# Patient Record
Sex: FEMALE | Race: BLACK OR AFRICAN AMERICAN | NOT HISPANIC OR LATINO | Employment: OTHER | URBAN - METROPOLITAN AREA
[De-identification: names, ages, dates, MRNs, and addresses within clinical notes are randomized per-mention and may not be internally consistent; named-entity substitution may affect disease eponyms.]

---

## 2017-07-24 ENCOUNTER — APPOINTMENT (OUTPATIENT)
Dept: WOMENS IMAGING | Facility: HOSPITAL | Age: 67
End: 2017-07-24

## 2017-07-24 PROCEDURE — 77063 BREAST TOMOSYNTHESIS BI: CPT | Performed by: RADIOLOGY

## 2017-07-24 PROCEDURE — G0202 SCR MAMMO BI INCL CAD: HCPCS | Performed by: RADIOLOGY

## 2017-07-24 PROCEDURE — MDREVIEWSP: Performed by: RADIOLOGY

## 2018-09-13 ENCOUNTER — APPOINTMENT (OUTPATIENT)
Dept: WOMENS IMAGING | Facility: HOSPITAL | Age: 68
End: 2018-09-13

## 2018-09-13 PROCEDURE — 77063 BREAST TOMOSYNTHESIS BI: CPT | Performed by: RADIOLOGY

## 2018-09-13 PROCEDURE — 77067 SCR MAMMO BI INCL CAD: CPT | Performed by: RADIOLOGY

## 2018-09-13 PROCEDURE — MDREVIEWSP: Performed by: RADIOLOGY

## 2019-09-17 ENCOUNTER — APPOINTMENT (OUTPATIENT)
Dept: WOMENS IMAGING | Facility: HOSPITAL | Age: 69
End: 2019-09-17

## 2019-09-17 PROCEDURE — 77063 BREAST TOMOSYNTHESIS BI: CPT | Performed by: RADIOLOGY

## 2019-09-17 PROCEDURE — 77067 SCR MAMMO BI INCL CAD: CPT | Performed by: RADIOLOGY

## 2019-09-17 PROCEDURE — MDREVIEWSP: Performed by: RADIOLOGY

## 2020-07-21 ENCOUNTER — OFFICE VISIT (OUTPATIENT)
Dept: CARDIOLOGY | Facility: CLINIC | Age: 70
End: 2020-07-21

## 2020-07-21 VITALS
WEIGHT: 182 LBS | HEART RATE: 57 BPM | SYSTOLIC BLOOD PRESSURE: 140 MMHG | DIASTOLIC BLOOD PRESSURE: 72 MMHG | BODY MASS INDEX: 31.07 KG/M2 | HEIGHT: 64 IN

## 2020-07-21 DIAGNOSIS — I10 ESSENTIAL HYPERTENSION: ICD-10-CM

## 2020-07-21 DIAGNOSIS — E78.5 DYSLIPIDEMIA: ICD-10-CM

## 2020-07-21 DIAGNOSIS — Z82.49 FAMILY HISTORY OF PREMATURE CORONARY ARTERY DISEASE: ICD-10-CM

## 2020-07-21 DIAGNOSIS — I20.0 UNSTABLE ANGINA (HCC): Primary | ICD-10-CM

## 2020-07-21 DIAGNOSIS — I25.10 CAD IN NATIVE ARTERY: ICD-10-CM

## 2020-07-21 PROBLEM — M10.9 GOUT: Status: ACTIVE | Noted: 2020-07-21

## 2020-07-21 PROBLEM — N18.9 CKD (CHRONIC KIDNEY DISEASE): Status: ACTIVE | Noted: 2020-07-21

## 2020-07-21 PROBLEM — E11.9 DIABETES: Status: ACTIVE | Noted: 2020-07-21

## 2020-07-21 PROCEDURE — 93000 ELECTROCARDIOGRAM COMPLETE: CPT | Performed by: INTERNAL MEDICINE

## 2020-07-21 PROCEDURE — 99204 OFFICE O/P NEW MOD 45 MIN: CPT | Performed by: INTERNAL MEDICINE

## 2020-07-21 NOTE — PROGRESS NOTES
Subjective:     Encounter Date:07/21/2020      Patient ID: Suzi Lindquist is a 69 y.o. female.    Chief Complaint:  Chief Complaint   Patient presents with   • Coronary Artery Disease       HPI:  History of Present Illness     I had the pleasure of seeing Ms. Lindquist in the office today.  She is a very pleasant 69-year-old female with a history of essential hypertension, dyslipidemia, diabetes mellitus, CKD (followed by Dr. Brendon Paiz) and positive family history of premature coronary disease.  She had cardiac catheterization in 2013 which revealed nonobstructive disease in her LAD.    Ms. Lindquist states that she has been experiencing chest discomfort with exertion for couple of years.  She states that when she is walking she begins to feel chest tightness and heaviness.  She states that she will stop her exertion and the symptoms will subside in just a few minutes.  There is no associated shortness of air, diaphoresis, nausea or vomiting.  She states that recently she was walking after eating a meal and had to stop 6-7 times due to chest discomfort.  She does not have palpitations, dizziness or near syncope.  She denies lower extremity edema, orthopnea or paroxysmal nocturnal dyspnea.  She has been told that she snores on occasion.  She has never been diagnosed with sleep apnea.  She does not use tobacco or alcohol.  Her EKG in the office today demonstrates sinus bradycardia, rate 57 otherwise normal    The following portions of the patient's history were reviewed and updated as appropriate: allergies, current medications, past family history, past medical history, past social history, past surgical history and problem list.    Problem List:  Patient Active Problem List   Diagnosis   • CAD in native artery   • Essential hypertension   • Diabetes (CMS/HCC)   • Family history of premature coronary artery disease   • Dyslipidemia   • Gout   • CKD (chronic kidney disease)   • Unstable angina (CMS/HCC)       Past  "Medical History:  Past Medical History:   Diagnosis Date   • Chronic kidney disease    • Coronary artery disease    • Diabetes mellitus (CMS/HCC)    • Gout    • Hyperlipidemia    • Hypertension        Past Surgical History:  Past Surgical History:   Procedure Laterality Date   • CARDIAC CATHETERIZATION  07/15/2013    LAD extended to the apex & there was 20% stenosis of the proximal segment followed by 30% to 50% stenosis in the mid segment. The left circumflex & RCAs were angiographically normal.     • [facility]&C HYSTEROSCOPY ENDOMETRIAL ABLATION DIAGNOSTIC LAPAROSCOPY     • GUM SURGERY         Social History:  Social History     Socioeconomic History   • Marital status:      Spouse name: Not on file   • Number of children: Not on file   • Years of education: Not on file   • Highest education level: Not on file   Tobacco Use   • Smoking status: Never Smoker   • Smokeless tobacco: Never Used       Allergies:  Allergies   Allergen Reactions   • Pineapple Itching     \"throat itch\"   • Avocado Nausea And Vomiting       Immunizations:    There is no immunization history on file for this patient.    ROS:  Review of Systems   Constitution: Negative for chills, decreased appetite, fever, malaise/fatigue, weight gain and weight loss.   HENT: Negative for congestion, hoarse voice, nosebleeds and sore throat.    Eyes: Negative for blurred vision, double vision and visual disturbance.   Cardiovascular: Positive for chest pain. Negative for claudication, dyspnea on exertion, irregular heartbeat, leg swelling, near-syncope, orthopnea, palpitations, paroxysmal nocturnal dyspnea and syncope.   Respiratory: Negative for cough, hemoptysis, shortness of breath, sleep disturbances due to breathing, snoring, sputum production and wheezing.    Endocrine: Negative for cold intolerance, heat intolerance, polydipsia and polyuria.   Hematologic/Lymphatic: Negative for adenopathy and bleeding problem. Does not bruise/bleed easily.   Skin: " "Negative for flushing, itching, nail changes and rash.   Musculoskeletal: Negative for arthritis, back pain, joint pain, muscle cramps, muscle weakness, myalgias and neck pain.   Gastrointestinal: Negative for bloating, abdominal pain, anorexia, change in bowel habit, constipation, diarrhea, heartburn, hematemesis, hematochezia, jaundice, melena, nausea and vomiting.   Genitourinary: Negative for dysuria, hematuria and nocturia.   Neurological: Negative for brief paralysis, disturbances in coordination, excessive daytime sleepiness, dizziness, headaches, light-headedness, loss of balance, numbness, paresthesias, seizures and vertigo.   Psychiatric/Behavioral: Negative for altered mental status and depression. The patient is not nervous/anxious.    Allergic/Immunologic: Negative for environmental allergies and hives.          Objective:         /72   Pulse 57   Ht 162.6 cm (64\")   Wt 82.6 kg (182 lb)   BMI 31.24 kg/m²     Physical Exam   Constitutional: She is oriented to person, place, and time. She appears well-developed and well-nourished. No distress.   HENT:   Head: Normocephalic and atraumatic.   Mouth/Throat: Oropharynx is clear and moist.   Eyes: Pupils are equal, round, and reactive to light. Conjunctivae and EOM are normal. No scleral icterus.   Neck: Normal range of motion. Neck supple. No thyromegaly present.   Cardiovascular: Regular rhythm, S1 normal, S2 normal and intact distal pulses.  No extrasystoles are present. Bradycardia present. PMI is not displaced. Exam reveals gallop and S4. Exam reveals no S3, no friction rub and no decreased pulses.   Murmur:  There is a 1/6 to 2/6 systolic murmur heard at the upper right sternal border.  Pulses:       Carotid pulses are 2+ on the right side with bruit, and 2+ on the left side.       Femoral pulses are 2+ on the right side, and 2+ on the left side.       Dorsalis pedis pulses are 2+ on the right side, and 2+ on the left side.        Posterior " tibial pulses are 2+ on the right side, and 2+ on the left side.   Pulmonary/Chest: Effort normal and breath sounds normal. No respiratory distress. She has no wheezes. She has no rales.   Abdominal: Soft. Bowel sounds are normal. She exhibits no distension and no mass. There is no tenderness. There is no rebound and no guarding.   Musculoskeletal: Normal range of motion. She exhibits no edema.   Lymphadenopathy:     She has no cervical adenopathy.   Neurological: She is alert and oriented to person, place, and time. Coordination normal.   Skin: Skin is warm and dry. No rash noted. She is not diaphoretic. No pallor.   Psychiatric: She has a normal mood and affect. Her behavior is normal.   Nursing note and vitals reviewed.      In-Office Procedure(s):    ECG 12 Lead  Date/Time: 7/21/2020 5:55 PM  Performed by: Betsy Pool MD  Authorized by: Betsy Pool MD   Previous ECG: no previous ECG available  Comments: Sinus bradycardia.  Otherwise normal            ASCVD RIsk Score::  The ASCVD Risk score (Monse DC Jr., et al., 2013) failed to calculate for the following reasons:    Cannot find a previous HDL lab    Cannot find a previous total cholesterol lab    Recent Radiology:  Imaging Results (Most Recent)     None          Lab Review:   not applicable             Assessment:          Diagnosis Plan   1. Unstable angina (CMS/HCC)     2. CAD in native artery     3. Essential hypertension     4. Family history of premature coronary artery disease     5. Dyslipidemia            Plan:   1.  Unstable angina/CAD  Ms. Lindquist presents with symptoms of chest discomfort which are occurring with exertion on a routine basis.  On one occasion she had to stop walking 6-7 times.  She has multiple cardiac risk factors.  She has not had an ischemic evaluation in a number of years.  I have discussed nuclear stress testing versus cardiac catheterization.  Given her current symptoms, I feel that cardiac catheterization would be  more appropriate.  I have explained both alternatives to Ms. Lindquist and she is agreeable to proceed with cardiac cath.    2.  Essential hypertension  Her blood pressure is elevated in the office today however she does monitor her pressure at home.  She states that she consistently gets readings in the 120 mmHg range.    3.  Family history of premature coronary disease.    Her mother  at age 69 after coronary artery bypass grafting.  Her father  at age 50 with a myocardial infarction.  She also has a brother with coronary disease.    4.  Dyslipidemia  In 2019, triglycerides were 188, total cholesterol 101, HDL 36 and LDL 39..  She is on rosuvastatin 20 mg daily    5.  CKD, stage III  She is followed by Dr. Brendon Paiz.  Creatinine in 2020 was 1.82    6.  Diabetes mellitus    We will proceed with cardiac catheterization next week.  She will need hydration prior to the procedure given her underlying chronic kidney disease.    Level of Care:                 Betsy Pool MD  20  .

## 2020-07-23 ENCOUNTER — HOSPITAL ENCOUNTER (OUTPATIENT)
Facility: HOSPITAL | Age: 70
Setting detail: HOSPITAL OUTPATIENT SURGERY
End: 2020-07-23
Attending: INTERNAL MEDICINE | Admitting: INTERNAL MEDICINE

## 2020-07-23 DIAGNOSIS — I20.0 UNSTABLE ANGINA (HCC): ICD-10-CM

## 2020-07-24 ENCOUNTER — LAB (OUTPATIENT)
Dept: LAB | Facility: HOSPITAL | Age: 70
End: 2020-07-24

## 2020-07-24 ENCOUNTER — TRANSCRIBE ORDERS (OUTPATIENT)
Dept: SLEEP MEDICINE | Facility: HOSPITAL | Age: 70
End: 2020-07-24

## 2020-07-24 DIAGNOSIS — Z79.4 TYPE 2 DIABETES MELLITUS WITHOUT COMPLICATION, WITH LONG-TERM CURRENT USE OF INSULIN (HCC): Primary | ICD-10-CM

## 2020-07-24 DIAGNOSIS — E11.9 TYPE 2 DIABETES MELLITUS WITHOUT COMPLICATION, WITH LONG-TERM CURRENT USE OF INSULIN (HCC): Primary | ICD-10-CM

## 2020-07-24 DIAGNOSIS — Z79.4 TYPE 2 DIABETES MELLITUS WITHOUT COMPLICATION, WITH LONG-TERM CURRENT USE OF INSULIN (HCC): ICD-10-CM

## 2020-07-24 DIAGNOSIS — N18.9 CHRONIC KIDNEY DISEASE, UNSPECIFIED CKD STAGE: ICD-10-CM

## 2020-07-24 DIAGNOSIS — Z01.818 OTHER SPECIFIED PRE-OPERATIVE EXAMINATION: Primary | ICD-10-CM

## 2020-07-24 DIAGNOSIS — E11.9 TYPE 2 DIABETES MELLITUS WITHOUT COMPLICATION, WITH LONG-TERM CURRENT USE OF INSULIN (HCC): ICD-10-CM

## 2020-07-24 DIAGNOSIS — Z01.818 PREOP TESTING: ICD-10-CM

## 2020-07-24 LAB
ALBUMIN SERPL-MCNC: 4.3 G/DL (ref 3.5–5.2)
ALBUMIN/GLOB SERPL: 1.2 G/DL
ALP SERPL-CCNC: 60 U/L (ref 39–117)
ALT SERPL W P-5'-P-CCNC: 20 U/L (ref 1–33)
ANION GAP SERPL CALCULATED.3IONS-SCNC: 8.3 MMOL/L (ref 5–15)
APTT PPP: 31.2 SECONDS (ref 22.7–35.4)
AST SERPL-CCNC: 26 U/L (ref 1–32)
BASOPHILS # BLD AUTO: 0.03 10*3/MM3 (ref 0–0.2)
BASOPHILS NFR BLD AUTO: 0.6 % (ref 0–1.5)
BILIRUB SERPL-MCNC: 0.4 MG/DL (ref 0–1.2)
BUN SERPL-MCNC: 36 MG/DL (ref 8–23)
BUN/CREAT SERPL: 18.1 (ref 7–25)
CALCIUM SPEC-SCNC: 9.3 MG/DL (ref 8.6–10.5)
CHLORIDE SERPL-SCNC: 103 MMOL/L (ref 98–107)
CO2 SERPL-SCNC: 26.7 MMOL/L (ref 22–29)
CREAT SERPL-MCNC: 1.99 MG/DL (ref 0.57–1)
DEPRECATED RDW RBC AUTO: 43.4 FL (ref 37–54)
EOSINOPHIL # BLD AUTO: 0.22 10*3/MM3 (ref 0–0.4)
EOSINOPHIL NFR BLD AUTO: 4.1 % (ref 0.3–6.2)
ERYTHROCYTE [DISTWIDTH] IN BLOOD BY AUTOMATED COUNT: 12.8 % (ref 12.3–15.4)
GFR SERPL CREATININE-BSD FRML MDRD: 30 ML/MIN/1.73
GLOBULIN UR ELPH-MCNC: 3.5 GM/DL
GLUCOSE SERPL-MCNC: 206 MG/DL (ref 65–99)
HCT VFR BLD AUTO: 34.5 % (ref 34–46.6)
HGB BLD-MCNC: 11.7 G/DL (ref 12–15.9)
IMM GRANULOCYTES # BLD AUTO: 0.01 10*3/MM3 (ref 0–0.05)
IMM GRANULOCYTES NFR BLD AUTO: 0.2 % (ref 0–0.5)
INR PPP: 1.05 (ref 0.9–1.1)
LYMPHOCYTES # BLD AUTO: 1.86 10*3/MM3 (ref 0.7–3.1)
LYMPHOCYTES NFR BLD AUTO: 34.8 % (ref 19.6–45.3)
MCH RBC QN AUTO: 31.5 PG (ref 26.6–33)
MCHC RBC AUTO-ENTMCNC: 33.9 G/DL (ref 31.5–35.7)
MCV RBC AUTO: 92.7 FL (ref 79–97)
MONOCYTES # BLD AUTO: 0.4 10*3/MM3 (ref 0.1–0.9)
MONOCYTES NFR BLD AUTO: 7.5 % (ref 5–12)
NEUTROPHILS NFR BLD AUTO: 2.83 10*3/MM3 (ref 1.7–7)
NEUTROPHILS NFR BLD AUTO: 52.8 % (ref 42.7–76)
NRBC BLD AUTO-RTO: 0 /100 WBC (ref 0–0.2)
PLATELET # BLD AUTO: 248 10*3/MM3 (ref 140–450)
PMV BLD AUTO: 10.4 FL (ref 6–12)
POTASSIUM SERPL-SCNC: 4.9 MMOL/L (ref 3.5–5.2)
PROT SERPL-MCNC: 7.8 G/DL (ref 6–8.5)
PROTHROMBIN TIME: 13.6 SECONDS (ref 11.7–14.2)
RBC # BLD AUTO: 3.72 10*6/MM3 (ref 3.77–5.28)
SODIUM SERPL-SCNC: 138 MMOL/L (ref 136–145)
WBC # BLD AUTO: 5.35 10*3/MM3 (ref 3.4–10.8)

## 2020-07-24 PROCEDURE — 36415 COLL VENOUS BLD VENIPUNCTURE: CPT

## 2020-07-24 PROCEDURE — 85730 THROMBOPLASTIN TIME PARTIAL: CPT

## 2020-07-24 PROCEDURE — 80053 COMPREHEN METABOLIC PANEL: CPT

## 2020-07-24 PROCEDURE — 85610 PROTHROMBIN TIME: CPT

## 2020-07-24 PROCEDURE — 85025 COMPLETE CBC W/AUTO DIFF WBC: CPT

## 2020-07-25 ENCOUNTER — LAB (OUTPATIENT)
Dept: LAB | Facility: HOSPITAL | Age: 70
End: 2020-07-25

## 2020-07-25 DIAGNOSIS — Z01.818 OTHER SPECIFIED PRE-OPERATIVE EXAMINATION: ICD-10-CM

## 2020-07-25 PROCEDURE — U0004 COV-19 TEST NON-CDC HGH THRU: HCPCS

## 2020-07-25 PROCEDURE — C9803 HOPD COVID-19 SPEC COLLECT: HCPCS

## 2020-07-27 LAB
REF LAB TEST METHOD: NORMAL
SARS-COV-2 RNA RESP QL NAA+PROBE: NOT DETECTED

## 2020-07-28 ENCOUNTER — HOSPITAL ENCOUNTER (OUTPATIENT)
Facility: HOSPITAL | Age: 70
Discharge: HOME | End: 2020-07-29
Attending: INTERNAL MEDICINE | Admitting: INTERNAL MEDICINE

## 2020-07-28 DIAGNOSIS — I20.0 UNSTABLE ANGINA (HCC): ICD-10-CM

## 2020-07-28 LAB
ALBUMIN SERPL-MCNC: 4.4 G/DL (ref 3.5–5.2)
ALBUMIN/GLOB SERPL: 1.4 G/DL
ALP SERPL-CCNC: 62 U/L (ref 39–117)
ALT SERPL W P-5'-P-CCNC: 23 U/L (ref 1–33)
ANION GAP SERPL CALCULATED.3IONS-SCNC: 11.3 MMOL/L (ref 5–15)
APTT PPP: 29.9 SECONDS (ref 22.7–35.4)
AST SERPL-CCNC: 23 U/L (ref 1–32)
B PARAPERT DNA SPEC QL NAA+PROBE: NOT DETECTED
B PERT DNA SPEC QL NAA+PROBE: NOT DETECTED
BILIRUB SERPL-MCNC: 0.3 MG/DL (ref 0–1.2)
BUN SERPL-MCNC: 34 MG/DL (ref 8–23)
BUN/CREAT SERPL: 18.4 (ref 7–25)
C PNEUM DNA NPH QL NAA+NON-PROBE: NOT DETECTED
CALCIUM SPEC-SCNC: 9.1 MG/DL (ref 8.6–10.5)
CHLORIDE SERPL-SCNC: 104 MMOL/L (ref 98–107)
CO2 SERPL-SCNC: 24.7 MMOL/L (ref 22–29)
CREAT SERPL-MCNC: 1.85 MG/DL (ref 0.57–1)
DEPRECATED RDW RBC AUTO: 40.5 FL (ref 37–54)
ERYTHROCYTE [DISTWIDTH] IN BLOOD BY AUTOMATED COUNT: 12.4 % (ref 12.3–15.4)
FLUAV H1 2009 PAND RNA NPH QL NAA+PROBE: NOT DETECTED
FLUAV H1 HA GENE NPH QL NAA+PROBE: NOT DETECTED
FLUAV H3 RNA NPH QL NAA+PROBE: NOT DETECTED
FLUAV SUBTYP SPEC NAA+PROBE: NOT DETECTED
FLUBV RNA ISLT QL NAA+PROBE: NOT DETECTED
GFR SERPL CREATININE-BSD FRML MDRD: 33 ML/MIN/1.73
GLOBULIN UR ELPH-MCNC: 3.2 GM/DL
GLUCOSE BLDC GLUCOMTR-MCNC: 153 MG/DL (ref 70–130)
GLUCOSE BLDC GLUCOMTR-MCNC: 156 MG/DL (ref 70–130)
GLUCOSE BLDC GLUCOMTR-MCNC: 211 MG/DL (ref 70–130)
GLUCOSE SERPL-MCNC: 142 MG/DL (ref 65–99)
HADV DNA SPEC NAA+PROBE: NOT DETECTED
HBA1C MFR BLD: 7.84 % (ref 4.8–5.6)
HCOV 229E RNA SPEC QL NAA+PROBE: NOT DETECTED
HCOV HKU1 RNA SPEC QL NAA+PROBE: NOT DETECTED
HCOV NL63 RNA SPEC QL NAA+PROBE: NOT DETECTED
HCOV OC43 RNA SPEC QL NAA+PROBE: NOT DETECTED
HCT VFR BLD AUTO: 34.3 % (ref 34–46.6)
HGB BLD-MCNC: 11.7 G/DL (ref 12–15.9)
HMPV RNA NPH QL NAA+NON-PROBE: NOT DETECTED
HPIV1 RNA SPEC QL NAA+PROBE: NOT DETECTED
HPIV2 RNA SPEC QL NAA+PROBE: NOT DETECTED
HPIV3 RNA NPH QL NAA+PROBE: NOT DETECTED
HPIV4 P GENE NPH QL NAA+PROBE: NOT DETECTED
INR PPP: 1.03 (ref 0.9–1.1)
M PNEUMO IGG SER IA-ACNC: NOT DETECTED
MAGNESIUM SERPL-MCNC: 2.3 MG/DL (ref 1.6–2.4)
MCH RBC QN AUTO: 31 PG (ref 26.6–33)
MCHC RBC AUTO-ENTMCNC: 34.1 G/DL (ref 31.5–35.7)
MCV RBC AUTO: 90.7 FL (ref 79–97)
PLATELET # BLD AUTO: 227 10*3/MM3 (ref 140–450)
PMV BLD AUTO: 10 FL (ref 6–12)
POTASSIUM SERPL-SCNC: 4.6 MMOL/L (ref 3.5–5.2)
PROT SERPL-MCNC: 7.6 G/DL (ref 6–8.5)
PROTHROMBIN TIME: 13.4 SECONDS (ref 11.7–14.2)
RBC # BLD AUTO: 3.78 10*6/MM3 (ref 3.77–5.28)
RHINOVIRUS RNA SPEC NAA+PROBE: NOT DETECTED
RSV RNA NPH QL NAA+NON-PROBE: NOT DETECTED
SARS-COV-2 RNA NPH QL NAA+NON-PROBE: NOT DETECTED
SODIUM SERPL-SCNC: 140 MMOL/L (ref 136–145)
TROPONIN T SERPL-MCNC: <0.01 NG/ML (ref 0–0.03)
TSH SERPL DL<=0.05 MIU/L-ACNC: 1.45 UIU/ML (ref 0.27–4.2)
WBC # BLD AUTO: 5.16 10*3/MM3 (ref 3.4–10.8)

## 2020-07-28 PROCEDURE — 63710000001 METOPROLOL SUCCINATE XL 50 MG TABLET SUSTAINED-RELEASE 24 HOUR: Performed by: NURSE PRACTITIONER

## 2020-07-28 PROCEDURE — 63710000001 INSULIN ISOPHANE HUMAN PER 5 UNITS: Performed by: NURSE PRACTITIONER

## 2020-07-28 PROCEDURE — 80053 COMPREHEN METABOLIC PANEL: CPT | Performed by: NURSE PRACTITIONER

## 2020-07-28 PROCEDURE — A9270 NON-COVERED ITEM OR SERVICE: HCPCS | Performed by: NURSE PRACTITIONER

## 2020-07-28 PROCEDURE — 63710000001 ALLOPURINOL 100 MG TABLET: Performed by: NURSE PRACTITIONER

## 2020-07-28 PROCEDURE — 96360 HYDRATION IV INFUSION INIT: CPT

## 2020-07-28 PROCEDURE — 63710000001 GLIPIZIDE 5 MG TABLET: Performed by: NURSE PRACTITIONER

## 2020-07-28 PROCEDURE — 85027 COMPLETE CBC AUTOMATED: CPT | Performed by: NURSE PRACTITIONER

## 2020-07-28 PROCEDURE — G0378 HOSPITAL OBSERVATION PER HR: HCPCS

## 2020-07-28 PROCEDURE — 63710000001 ROSUVASTATIN 10 MG TABLET: Performed by: NURSE PRACTITIONER

## 2020-07-28 PROCEDURE — 93010 ELECTROCARDIOGRAM REPORT: CPT | Performed by: INTERNAL MEDICINE

## 2020-07-28 PROCEDURE — 84443 ASSAY THYROID STIM HORMONE: CPT | Performed by: NURSE PRACTITIONER

## 2020-07-28 PROCEDURE — 93005 ELECTROCARDIOGRAM TRACING: CPT | Performed by: NURSE PRACTITIONER

## 2020-07-28 PROCEDURE — 63710000001 AMLODIPINE 10 MG TABLET: Performed by: NURSE PRACTITIONER

## 2020-07-28 PROCEDURE — 85730 THROMBOPLASTIN TIME PARTIAL: CPT | Performed by: NURSE PRACTITIONER

## 2020-07-28 PROCEDURE — 96361 HYDRATE IV INFUSION ADD-ON: CPT

## 2020-07-28 PROCEDURE — 0202U NFCT DS 22 TRGT SARS-COV-2: CPT | Performed by: INTERNAL MEDICINE

## 2020-07-28 PROCEDURE — 82962 GLUCOSE BLOOD TEST: CPT

## 2020-07-28 PROCEDURE — 83735 ASSAY OF MAGNESIUM: CPT | Performed by: NURSE PRACTITIONER

## 2020-07-28 PROCEDURE — 83036 HEMOGLOBIN GLYCOSYLATED A1C: CPT | Performed by: NURSE PRACTITIONER

## 2020-07-28 PROCEDURE — 84484 ASSAY OF TROPONIN QUANT: CPT | Performed by: NURSE PRACTITIONER

## 2020-07-28 PROCEDURE — 85610 PROTHROMBIN TIME: CPT | Performed by: NURSE PRACTITIONER

## 2020-07-28 PROCEDURE — 63710000001 ASPIRIN 81 MG TABLET DELAYED-RELEASE: Performed by: NURSE PRACTITIONER

## 2020-07-28 PROCEDURE — 63710000001 GABAPENTIN 300 MG CAPSULE: Performed by: NURSE PRACTITIONER

## 2020-07-28 RX ORDER — LOSARTAN POTASSIUM 25 MG/1
25 TABLET ORAL DAILY
Status: DISCONTINUED | OUTPATIENT
Start: 2020-07-28 | End: 2020-07-28

## 2020-07-28 RX ORDER — ASPIRIN 81 MG/1
81 TABLET ORAL DAILY
Status: DISCONTINUED | OUTPATIENT
Start: 2020-07-28 | End: 2020-07-29 | Stop reason: HOSPADM

## 2020-07-28 RX ORDER — ACETAMINOPHEN 325 MG/1
650 TABLET ORAL EVERY 4 HOURS PRN
Status: DISCONTINUED | OUTPATIENT
Start: 2020-07-28 | End: 2020-07-29 | Stop reason: HOSPADM

## 2020-07-28 RX ORDER — SODIUM CHLORIDE 0.9 % (FLUSH) 0.9 %
10 SYRINGE (ML) INJECTION EVERY 12 HOURS SCHEDULED
Status: DISCONTINUED | OUTPATIENT
Start: 2020-07-28 | End: 2020-07-29 | Stop reason: HOSPADM

## 2020-07-28 RX ORDER — SODIUM CHLORIDE 9 MG/ML
75 INJECTION, SOLUTION INTRAVENOUS CONTINUOUS
Status: DISCONTINUED | OUTPATIENT
Start: 2020-07-28 | End: 2020-07-29 | Stop reason: HOSPADM

## 2020-07-28 RX ORDER — NITROGLYCERIN 0.4 MG/1
0.4 TABLET SUBLINGUAL
Status: DISCONTINUED | OUTPATIENT
Start: 2020-07-28 | End: 2020-07-29 | Stop reason: HOSPADM

## 2020-07-28 RX ORDER — DEXTROSE MONOHYDRATE 25 G/50ML
25 INJECTION, SOLUTION INTRAVENOUS
Status: DISCONTINUED | OUTPATIENT
Start: 2020-07-28 | End: 2020-07-29 | Stop reason: HOSPADM

## 2020-07-28 RX ORDER — AMLODIPINE BESYLATE 10 MG/1
10 TABLET ORAL DAILY
Status: DISCONTINUED | OUTPATIENT
Start: 2020-07-28 | End: 2020-07-29 | Stop reason: HOSPADM

## 2020-07-28 RX ORDER — SODIUM CHLORIDE 0.9 % (FLUSH) 0.9 %
10 SYRINGE (ML) INJECTION AS NEEDED
Status: DISCONTINUED | OUTPATIENT
Start: 2020-07-28 | End: 2020-07-29 | Stop reason: HOSPADM

## 2020-07-28 RX ORDER — GLIPIZIDE 5 MG/1
5 TABLET ORAL
Status: DISCONTINUED | OUTPATIENT
Start: 2020-07-28 | End: 2020-07-29 | Stop reason: HOSPADM

## 2020-07-28 RX ORDER — GABAPENTIN 300 MG/1
300 CAPSULE ORAL DAILY
Status: DISCONTINUED | OUTPATIENT
Start: 2020-07-28 | End: 2020-07-29 | Stop reason: HOSPADM

## 2020-07-28 RX ORDER — ROSUVASTATIN CALCIUM 10 MG/1
10 TABLET, COATED ORAL NIGHTLY
Status: DISCONTINUED | OUTPATIENT
Start: 2020-07-28 | End: 2020-07-29 | Stop reason: HOSPADM

## 2020-07-28 RX ORDER — NICOTINE POLACRILEX 4 MG
15 LOZENGE BUCCAL
Status: DISCONTINUED | OUTPATIENT
Start: 2020-07-28 | End: 2020-07-29 | Stop reason: HOSPADM

## 2020-07-28 RX ORDER — ALLOPURINOL 100 MG/1
100 TABLET ORAL DAILY
Status: DISCONTINUED | OUTPATIENT
Start: 2020-07-28 | End: 2020-07-29 | Stop reason: HOSPADM

## 2020-07-28 RX ORDER — METOPROLOL SUCCINATE 50 MG/1
50 TABLET, EXTENDED RELEASE ORAL 2 TIMES DAILY
Status: DISCONTINUED | OUTPATIENT
Start: 2020-07-28 | End: 2020-07-29 | Stop reason: HOSPADM

## 2020-07-28 RX ORDER — CHOLECALCIFEROL (VITAMIN D3) 125 MCG
5 CAPSULE ORAL NIGHTLY PRN
Status: DISCONTINUED | OUTPATIENT
Start: 2020-07-28 | End: 2020-07-29 | Stop reason: HOSPADM

## 2020-07-28 RX ADMIN — GABAPENTIN 300 MG: 300 CAPSULE ORAL at 21:11

## 2020-07-28 RX ADMIN — ALLOPURINOL 100 MG: 100 TABLET ORAL at 16:27

## 2020-07-28 RX ADMIN — METOPROLOL SUCCINATE 50 MG: 50 TABLET, EXTENDED RELEASE ORAL at 21:11

## 2020-07-28 RX ADMIN — SODIUM CHLORIDE, PRESERVATIVE FREE 10 ML: 5 INJECTION INTRAVENOUS at 21:12

## 2020-07-28 RX ADMIN — SODIUM CHLORIDE 75 ML/HR: 9 INJECTION, SOLUTION INTRAVENOUS at 21:12

## 2020-07-28 RX ADMIN — GLIPIZIDE 5 MG: 5 TABLET ORAL at 16:27

## 2020-07-28 RX ADMIN — AMLODIPINE BESYLATE 10 MG: 10 TABLET ORAL at 16:27

## 2020-07-28 RX ADMIN — ROSUVASTATIN CALCIUM 10 MG: 10 TABLET, FILM COATED ORAL at 21:11

## 2020-07-28 RX ADMIN — HUMAN INSULIN 32 UNITS: 100 INJECTION, SUSPENSION SUBCUTANEOUS at 21:20

## 2020-07-28 RX ADMIN — ASPIRIN 81 MG: 81 TABLET, COATED ORAL at 21:11

## 2020-07-29 ENCOUNTER — APPOINTMENT (OUTPATIENT)
Dept: CARDIOLOGY | Facility: HOSPITAL | Age: 70
End: 2020-07-29

## 2020-07-29 VITALS
SYSTOLIC BLOOD PRESSURE: 130 MMHG | RESPIRATION RATE: 18 BRPM | HEART RATE: 56 BPM | HEIGHT: 64 IN | OXYGEN SATURATION: 94 % | WEIGHT: 179 LBS | BODY MASS INDEX: 30.56 KG/M2 | DIASTOLIC BLOOD PRESSURE: 62 MMHG | TEMPERATURE: 97.7 F

## 2020-07-29 LAB
ANION GAP SERPL CALCULATED.3IONS-SCNC: 8.8 MMOL/L (ref 5–15)
BH CV ECHO MEAS - ACS: 1.7 CM
BH CV ECHO MEAS - AO MAX PG (FULL): 9.9 MMHG
BH CV ECHO MEAS - AO MAX PG: 15.4 MMHG
BH CV ECHO MEAS - AO MEAN PG (FULL): 5 MMHG
BH CV ECHO MEAS - AO MEAN PG: 8 MMHG
BH CV ECHO MEAS - AO ROOT AREA (BSA CORRECTED): 1.2
BH CV ECHO MEAS - AO ROOT AREA: 4.2 CM^2
BH CV ECHO MEAS - AO ROOT DIAM: 2.3 CM
BH CV ECHO MEAS - AO V2 MAX: 196 CM/SEC
BH CV ECHO MEAS - AO V2 MEAN: 128 CM/SEC
BH CV ECHO MEAS - AO V2 VTI: 45.1 CM
BH CV ECHO MEAS - AVA(I,A): 2 CM^2
BH CV ECHO MEAS - AVA(I,D): 2 CM^2
BH CV ECHO MEAS - AVA(V,A): 1.9 CM^2
BH CV ECHO MEAS - AVA(V,D): 1.9 CM^2
BH CV ECHO MEAS - BSA(HAYCOCK): 1.9 M^2
BH CV ECHO MEAS - BSA: 1.9 M^2
BH CV ECHO MEAS - BZI_BMI: 30.7 KILOGRAMS/M^2
BH CV ECHO MEAS - BZI_METRIC_HEIGHT: 162.6 CM
BH CV ECHO MEAS - BZI_METRIC_WEIGHT: 81.2 KG
BH CV ECHO MEAS - EDV(CUBED): 46.7 ML
BH CV ECHO MEAS - EDV(MOD-SP2): 57 ML
BH CV ECHO MEAS - EDV(MOD-SP4): 67 ML
BH CV ECHO MEAS - EDV(TEICH): 54.4 ML
BH CV ECHO MEAS - EF(CUBED): 66.5 %
BH CV ECHO MEAS - EF(MOD-BP): 68.2 %
BH CV ECHO MEAS - EF(MOD-SP2): 68.4 %
BH CV ECHO MEAS - EF(MOD-SP4): 67.2 %
BH CV ECHO MEAS - EF(TEICH): 59 %
BH CV ECHO MEAS - ESV(CUBED): 15.6 ML
BH CV ECHO MEAS - ESV(MOD-SP2): 18 ML
BH CV ECHO MEAS - ESV(MOD-SP4): 22 ML
BH CV ECHO MEAS - ESV(TEICH): 22.3 ML
BH CV ECHO MEAS - FS: 30.6 %
BH CV ECHO MEAS - IVS/LVPW: 0.91
BH CV ECHO MEAS - IVSD: 1 CM
BH CV ECHO MEAS - LAT PEAK E' VEL: 11.3 CM/SEC
BH CV ECHO MEAS - LV DIASTOLIC VOL/BSA (35-75): 35.9 ML/M^2
BH CV ECHO MEAS - LV MASS(C)D: 115.9 GRAMS
BH CV ECHO MEAS - LV MASS(C)DI: 62.1 GRAMS/M^2
BH CV ECHO MEAS - LV MAX PG: 5.5 MMHG
BH CV ECHO MEAS - LV MEAN PG: 3 MMHG
BH CV ECHO MEAS - LV SYSTOLIC VOL/BSA (12-30): 11.8 ML/M^2
BH CV ECHO MEAS - LV V1 MAX: 117 CM/SEC
BH CV ECHO MEAS - LV V1 MEAN: 80.7 CM/SEC
BH CV ECHO MEAS - LV V1 VTI: 29.2 CM
BH CV ECHO MEAS - LVIDD: 3.6 CM
BH CV ECHO MEAS - LVIDS: 2.5 CM
BH CV ECHO MEAS - LVLD AP2: 7.2 CM
BH CV ECHO MEAS - LVLD AP4: 7.4 CM
BH CV ECHO MEAS - LVLS AP2: 5.9 CM
BH CV ECHO MEAS - LVLS AP4: 5.8 CM
BH CV ECHO MEAS - LVOT AREA (M): 3.1 CM^2
BH CV ECHO MEAS - LVOT AREA: 3.1 CM^2
BH CV ECHO MEAS - LVOT DIAM: 2 CM
BH CV ECHO MEAS - LVPWD: 1.1 CM
BH CV ECHO MEAS - MED PEAK E' VEL: 11.2 CM/SEC
BH CV ECHO MEAS - MV A DUR: 0.21 SEC
BH CV ECHO MEAS - MV A MAX VEL: 100 CM/SEC
BH CV ECHO MEAS - MV DEC SLOPE: 359 CM/SEC^2
BH CV ECHO MEAS - MV DEC TIME: 0.19 SEC
BH CV ECHO MEAS - MV E MAX VEL: 119 CM/SEC
BH CV ECHO MEAS - MV E/A: 1.2
BH CV ECHO MEAS - MV MAX PG: 6.9 MMHG
BH CV ECHO MEAS - MV MEAN PG: 2 MMHG
BH CV ECHO MEAS - MV P1/2T MAX VEL: 126 CM/SEC
BH CV ECHO MEAS - MV P1/2T: 102.8 MSEC
BH CV ECHO MEAS - MV V2 MAX: 131 CM/SEC
BH CV ECHO MEAS - MV V2 MEAN: 64.2 CM/SEC
BH CV ECHO MEAS - MV V2 VTI: 45.8 CM
BH CV ECHO MEAS - MVA P1/2T LCG: 1.7 CM^2
BH CV ECHO MEAS - MVA(P1/2T): 2.1 CM^2
BH CV ECHO MEAS - MVA(VTI): 2 CM^2
BH CV ECHO MEAS - PA ACC TIME: 0.1 SEC
BH CV ECHO MEAS - PA MAX PG (FULL): 3.6 MMHG
BH CV ECHO MEAS - PA MAX PG: 4.6 MMHG
BH CV ECHO MEAS - PA PR(ACCEL): 34.9 MMHG
BH CV ECHO MEAS - PA V2 MAX: 107 CM/SEC
BH CV ECHO MEAS - PULM A REVS DUR: 0.2 SEC
BH CV ECHO MEAS - PULM A REVS VEL: 32.7 CM/SEC
BH CV ECHO MEAS - PULM DIAS VEL: 27 CM/SEC
BH CV ECHO MEAS - PULM S/D: 1.7
BH CV ECHO MEAS - PULM SYS VEL: 47.1 CM/SEC
BH CV ECHO MEAS - PVA(V,A): 1 CM^2
BH CV ECHO MEAS - PVA(V,D): 1 CM^2
BH CV ECHO MEAS - QP/QS: 0.28
BH CV ECHO MEAS - RV MAX PG: 0.96 MMHG
BH CV ECHO MEAS - RV MEAN PG: 1 MMHG
BH CV ECHO MEAS - RV V1 MAX: 49 CM/SEC
BH CV ECHO MEAS - RV V1 MEAN: 33.2 CM/SEC
BH CV ECHO MEAS - RV V1 VTI: 11.2 CM
BH CV ECHO MEAS - RVOT AREA: 2.3 CM^2
BH CV ECHO MEAS - RVOT DIAM: 1.7 CM
BH CV ECHO MEAS - SI(AO): 100.4 ML/M^2
BH CV ECHO MEAS - SI(CUBED): 16.6 ML/M^2
BH CV ECHO MEAS - SI(LVOT): 49.2 ML/M^2
BH CV ECHO MEAS - SI(MOD-SP2): 20.9 ML/M^2
BH CV ECHO MEAS - SI(MOD-SP4): 24.1 ML/M^2
BH CV ECHO MEAS - SI(TEICH): 17.2 ML/M^2
BH CV ECHO MEAS - SV(AO): 187.4 ML
BH CV ECHO MEAS - SV(CUBED): 31 ML
BH CV ECHO MEAS - SV(LVOT): 91.7 ML
BH CV ECHO MEAS - SV(MOD-SP2): 39 ML
BH CV ECHO MEAS - SV(MOD-SP4): 45 ML
BH CV ECHO MEAS - SV(RVOT): 25.4 ML
BH CV ECHO MEAS - SV(TEICH): 32.1 ML
BH CV ECHO MEAS - TAPSE (>1.6): 2.9 CM2
BH CV ECHO MEASUREMENTS AVERAGE E/E' RATIO: 10.58
BH CV XLRA - RV BASE: 2.5 CM
BH CV XLRA - RV LENGTH: 7.3 CM
BH CV XLRA - RV MID: 2.3 CM
BH CV XLRA - TDI S': 14.6 CM/SEC
BUN SERPL-MCNC: 28 MG/DL (ref 8–23)
BUN/CREAT SERPL: 17.5 (ref 7–25)
CALCIUM SPEC-SCNC: 9 MG/DL (ref 8.6–10.5)
CHLORIDE SERPL-SCNC: 107 MMOL/L (ref 98–107)
CHOLEST SERPL-MCNC: 81 MG/DL (ref 0–200)
CO2 SERPL-SCNC: 24.2 MMOL/L (ref 22–29)
CREAT SERPL-MCNC: 1.6 MG/DL (ref 0.57–1)
GFR SERPL CREATININE-BSD FRML MDRD: 39 ML/MIN/1.73
GLUCOSE BLDC GLUCOMTR-MCNC: 100 MG/DL (ref 70–130)
GLUCOSE BLDC GLUCOMTR-MCNC: 103 MG/DL (ref 70–130)
GLUCOSE SERPL-MCNC: 97 MG/DL (ref 65–99)
HDLC SERPL-MCNC: 29 MG/DL (ref 40–60)
LDLC SERPL CALC-MCNC: 25 MG/DL (ref 0–100)
LDLC/HDLC SERPL: 0.85 {RATIO}
LEFT ATRIUM VOLUME INDEX: 16 ML/M2
POTASSIUM SERPL-SCNC: 4.1 MMOL/L (ref 3.5–5.2)
SODIUM SERPL-SCNC: 140 MMOL/L (ref 136–145)
TRIGL SERPL-MCNC: 137 MG/DL (ref 0–150)
VLDLC SERPL-MCNC: 27.4 MG/DL (ref 5–40)

## 2020-07-29 PROCEDURE — 96361 HYDRATE IV INFUSION ADD-ON: CPT

## 2020-07-29 PROCEDURE — 63710000001 ASPIRIN 81 MG TABLET DELAYED-RELEASE: Performed by: NURSE PRACTITIONER

## 2020-07-29 PROCEDURE — 63710000001 METOPROLOL SUCCINATE XL 50 MG TABLET SUSTAINED-RELEASE 24 HOUR: Performed by: NURSE PRACTITIONER

## 2020-07-29 PROCEDURE — 25010000002 HEPARIN (PORCINE) PER 1000 UNITS: Performed by: INTERNAL MEDICINE

## 2020-07-29 PROCEDURE — 25010000002 FENTANYL CITRATE (PF) 100 MCG/2ML SOLUTION: Performed by: INTERNAL MEDICINE

## 2020-07-29 PROCEDURE — A9270 NON-COVERED ITEM OR SERVICE: HCPCS | Performed by: NURSE PRACTITIONER

## 2020-07-29 PROCEDURE — G0378 HOSPITAL OBSERVATION PER HR: HCPCS

## 2020-07-29 PROCEDURE — 93458 L HRT ARTERY/VENTRICLE ANGIO: CPT | Performed by: INTERNAL MEDICINE

## 2020-07-29 PROCEDURE — 25010000002 MIDAZOLAM PER 1 MG: Performed by: INTERNAL MEDICINE

## 2020-07-29 PROCEDURE — 82962 GLUCOSE BLOOD TEST: CPT

## 2020-07-29 PROCEDURE — 80048 BASIC METABOLIC PNL TOTAL CA: CPT | Performed by: NURSE PRACTITIONER

## 2020-07-29 PROCEDURE — 93306 TTE W/DOPPLER COMPLETE: CPT | Performed by: INTERNAL MEDICINE

## 2020-07-29 PROCEDURE — 63710000001 MELATONIN 5 MG TABLET: Performed by: NURSE PRACTITIONER

## 2020-07-29 PROCEDURE — 0 IOPAMIDOL PER 1 ML: Performed by: INTERNAL MEDICINE

## 2020-07-29 PROCEDURE — 99152 MOD SED SAME PHYS/QHP 5/>YRS: CPT | Performed by: INTERNAL MEDICINE

## 2020-07-29 PROCEDURE — C1894 INTRO/SHEATH, NON-LASER: HCPCS | Performed by: INTERNAL MEDICINE

## 2020-07-29 PROCEDURE — 80061 LIPID PANEL: CPT | Performed by: NURSE PRACTITIONER

## 2020-07-29 PROCEDURE — 63710000001 ALLOPURINOL 100 MG TABLET: Performed by: NURSE PRACTITIONER

## 2020-07-29 PROCEDURE — 99225 PR SBSQ OBSERVATION CARE/DAY 25 MINUTES: CPT | Performed by: NURSE PRACTITIONER

## 2020-07-29 PROCEDURE — 93306 TTE W/DOPPLER COMPLETE: CPT

## 2020-07-29 PROCEDURE — C1769 GUIDE WIRE: HCPCS | Performed by: INTERNAL MEDICINE

## 2020-07-29 RX ORDER — SODIUM CHLORIDE 9 MG/ML
100 INJECTION, SOLUTION INTRAVENOUS CONTINUOUS
Status: DISCONTINUED | OUTPATIENT
Start: 2020-07-29 | End: 2020-07-29 | Stop reason: HOSPADM

## 2020-07-29 RX ORDER — ACETAMINOPHEN 325 MG/1
650 TABLET ORAL EVERY 4 HOURS PRN
Status: DISCONTINUED | OUTPATIENT
Start: 2020-07-29 | End: 2020-07-29 | Stop reason: HOSPADM

## 2020-07-29 RX ORDER — LIDOCAINE HYDROCHLORIDE 20 MG/ML
INJECTION, SOLUTION INFILTRATION; PERINEURAL AS NEEDED
Status: DISCONTINUED | OUTPATIENT
Start: 2020-07-29 | End: 2020-07-29 | Stop reason: HOSPADM

## 2020-07-29 RX ORDER — MIDAZOLAM HYDROCHLORIDE 1 MG/ML
INJECTION INTRAMUSCULAR; INTRAVENOUS AS NEEDED
Status: DISCONTINUED | OUTPATIENT
Start: 2020-07-29 | End: 2020-07-29 | Stop reason: HOSPADM

## 2020-07-29 RX ORDER — FENTANYL CITRATE 50 UG/ML
INJECTION, SOLUTION INTRAMUSCULAR; INTRAVENOUS AS NEEDED
Status: DISCONTINUED | OUTPATIENT
Start: 2020-07-29 | End: 2020-07-29 | Stop reason: HOSPADM

## 2020-07-29 RX ADMIN — SODIUM CHLORIDE, PRESERVATIVE FREE 10 ML: 5 INJECTION INTRAVENOUS at 08:21

## 2020-07-29 RX ADMIN — METOPROLOL SUCCINATE 50 MG: 50 TABLET, EXTENDED RELEASE ORAL at 08:21

## 2020-07-29 RX ADMIN — ALLOPURINOL 100 MG: 100 TABLET ORAL at 08:20

## 2020-07-29 RX ADMIN — Medication 5 MG: at 00:12

## 2020-07-29 RX ADMIN — ASPIRIN 81 MG: 81 TABLET, COATED ORAL at 08:20

## 2020-08-26 ENCOUNTER — OFFICE VISIT (OUTPATIENT)
Dept: CARDIOLOGY | Facility: CLINIC | Age: 70
End: 2020-08-26

## 2020-08-26 VITALS
WEIGHT: 183 LBS | HEIGHT: 64 IN | DIASTOLIC BLOOD PRESSURE: 72 MMHG | BODY MASS INDEX: 31.24 KG/M2 | SYSTOLIC BLOOD PRESSURE: 120 MMHG | HEART RATE: 64 BPM

## 2020-08-26 DIAGNOSIS — I25.10 CAD IN NATIVE ARTERY: Primary | ICD-10-CM

## 2020-08-26 DIAGNOSIS — E78.5 DYSLIPIDEMIA: ICD-10-CM

## 2020-08-26 DIAGNOSIS — I10 ESSENTIAL HYPERTENSION: ICD-10-CM

## 2020-08-26 PROBLEM — I20.0 UNSTABLE ANGINA (HCC): Status: RESOLVED | Noted: 2020-07-21 | Resolved: 2020-08-26

## 2020-08-26 PROCEDURE — 99214 OFFICE O/P EST MOD 30 MIN: CPT | Performed by: INTERNAL MEDICINE

## 2020-08-26 NOTE — PROGRESS NOTES
Subjective:     Encounter Date:08/26/2020      Patient ID: Suzi Lindquist is a 69 y.o. female.    Chief Complaint:  Chief Complaint   Patient presents with   • Coronary Artery Disease       HPI:  History of Present Illness    I had the pleasure of Ameena in the office today.  She is a 69-year-old female with a history of essential hypertension, dyslipidemia, diabetes mellitus, CKD and a positive family history of premature coronary disease.  She had cardiac catheterization in 2013 which revealed a 40 to 50% stenosis of her LAD.    She presented to the office in July 2020 with complaints of exertional chest discomfort which sounded typical of angina.  She underwent cardiac catheterization on 7/29/2020.  She was found to have a smoothly mildly calcified 30 to 40% narrowing of the mid proximal segment of the LAD.  The other vessels revealed no significant disease.  Her ejection fraction was normal.  She is following up in the office today.  Ms. Lindquist continues to have similar symptoms to what led to cardiac catheterization.  We discussed the possibility of a GI etiology or exertion induced asthma.  She does not have any wheezing or significant dyspnea while walking.  We discussed that she try a trial of Prilosec for a month.    The following portions of the patient's history were reviewed and updated as appropriate: allergies, current medications, past family history, past medical history, past social history, past surgical history and problem list.    Problem List:  Patient Active Problem List   Diagnosis   • CAD in native artery   • Essential hypertension   • Diabetes (CMS/HCC)   • Family history of premature coronary artery disease   • Dyslipidemia   • Gout   • CKD (chronic kidney disease)       Past Medical History:  Past Medical History:   Diagnosis Date   • Chronic kidney disease    • Coronary artery disease    • Diabetes mellitus (CMS/HCC)    • Gout    • Hyperlipidemia    • Hypertension        Past  "Surgical History:  Past Surgical History:   Procedure Laterality Date   • CARDIAC CATHETERIZATION  07/15/2013    LAD extended to the apex & there was 20% stenosis of the proximal segment followed by 30% to 50% stenosis in the mid segment. The left circumflex & RCAs were angiographically normal.     • CARDIAC CATHETERIZATION N/A 7/29/2020    Procedure: Left heart cath;  Surgeon: Torey Machuca MD;  Location: [facility] CATH INVASIVE LOCATION;  Service: Cardiology;  Laterality: N/A;   • CARDIAC CATHETERIZATION N/A 7/29/2020    Procedure: Coronary angiography;  Surgeon: Torey Machuca MD;  Location:  [facility] CATH INVASIVE LOCATION;  Service: Cardiology;  Laterality: N/A;   • D&C HYSTEROSCOPY ENDOMETRIAL ABLATION DIAGNOSTIC LAPAROSCOPY     • GUM SURGERY         Social History:  Social History     Socioeconomic History   • Marital status:      Spouse name: Not on file   • Number of children: Not on file   • Years of education: Not on file   • Highest education level: Not on file   Tobacco Use   • Smoking status: Never Smoker   • Smokeless tobacco: Never Used   Substance and Sexual Activity   • Alcohol use: Never     Frequency: Never   • Drug use: Never       Allergies:  Allergies   Allergen Reactions   • Pineapple Itching     \"throat itch\"   • Avocado Nausea And Vomiting       Immunizations:    There is no immunization history on file for this patient.    ROS:  Review of Systems   Constitution: Negative for chills, decreased appetite, fever, malaise/fatigue, weight gain and weight loss.   HENT: Negative for congestion, hoarse voice, nosebleeds and sore throat.    Eyes: Negative for blurred vision, double vision and visual disturbance.   Cardiovascular: Positive for chest pain. Negative for claudication, dyspnea on exertion, irregular heartbeat, leg swelling, near-syncope, orthopnea, palpitations, paroxysmal nocturnal dyspnea and syncope.   Respiratory: Negative for cough, hemoptysis, shortness " "of breath, sleep disturbances due to breathing, snoring, sputum production and wheezing.    Endocrine: Negative for cold intolerance, heat intolerance, polydipsia and polyuria.   Hematologic/Lymphatic: Negative for adenopathy and bleeding problem. Does not bruise/bleed easily.   Skin: Negative for flushing, itching, nail changes and rash.   Musculoskeletal: Negative for arthritis, back pain, joint pain, muscle cramps, muscle weakness, myalgias and neck pain.   Gastrointestinal: Negative for bloating, abdominal pain, anorexia, change in bowel habit, constipation, diarrhea, heartburn, hematemesis, hematochezia, jaundice, melena, nausea and vomiting.   Genitourinary: Negative for dysuria, hematuria and nocturia.   Neurological: Negative for brief paralysis, disturbances in coordination, excessive daytime sleepiness, dizziness, headaches, light-headedness, loss of balance, numbness, paresthesias, seizures and vertigo.   Psychiatric/Behavioral: Negative for altered mental status and depression. The patient is not nervous/anxious.    Allergic/Immunologic: Negative for environmental allergies and hives.          Objective:         /72   Pulse 64   Ht 162.6 cm (64\")   Wt 83 kg (183 lb)   BMI 31.41 kg/m²   Physical exam is unchanged from previous visit  Physical Exam   Constitutional: She is oriented to person, place, and time. She appears well-developed and well-nourished. No distress.   HENT:   Head: Normocephalic and atraumatic.   Mouth/Throat: Oropharynx is clear and moist.   Eyes: Pupils are equal, round, and reactive to light. Conjunctivae and EOM are normal. No scleral icterus.   Neck: Normal range of motion. Neck supple. No thyromegaly present.   Cardiovascular: Regular rhythm, S1 normal, S2 normal and intact distal pulses.  No extrasystoles are present. Bradycardia present. PMI is not displaced. Exam reveals no S3, no friction rub and no decreased pulses.   Murmur:  There is a 1/6 to 2/6 systolic murmur " heard at the upper right sternal border.  Pulses:       Carotid pulses are 2+ on the right side with bruit, and 2+ on the left side.       Femoral pulses are 2+ on the right side, and 2+ on the left side.       Dorsalis pedis pulses are 2+ on the right side, and 2+ on the left side.        Posterior tibial pulses are 2+ on the right side, and 2+ on the left side.   Pulmonary/Chest: Effort normal and breath sounds normal. No respiratory distress. She has no wheezes. She has no rales.   Abdominal: Soft. Bowel sounds are normal. She exhibits no distension and no mass. There is no tenderness. There is no rebound and no guarding.   Musculoskeletal: Normal range of motion. She exhibits no edema.   Lymphadenopathy:     She has no cervical adenopathy.   Neurological: She is alert and oriented to person, place, and time. Coordination normal.   Skin: Skin is warm and dry. No rash noted. She is not diaphoretic. No pallor.   Psychiatric: She has a normal mood and affect. Her behavior is normal.   Nursing note and vitals reviewed.      In-Office Procedure(s):  Procedures    ASCVD RIsk Score::  The ASCVD Risk score (Clare GLORIA Jr., et al., 2013) failed to calculate for the following reasons:    The valid total cholesterol range is 130 to 320 mg/dL    Recent Radiology:  Imaging Results (Most Recent)     None          Lab Review:   not applicable             Assessment:          Diagnosis Plan   1. CAD in native artery     2. Essential hypertension     3. Dyslipidemia            Plan:      1.  CAD   2013: LAD-there is a narrowing of the proximal segment followed by 40-50.  Circumflex and right coronary arteries were angiographically normal    July 2020: LAD with 30 to 40% stenosis.  Circumflex and right coronary arteries revealed no significant disease  She is on aspirin, statin therapy and beta-blocker therapy    2.  Essential hypertension  We discussed the importance of adequate blood pressure control.  Her blood pressure is normal  on today's visit.    3.  Dyslipidemia  She is on rosuvastatin.  Her LDL in July 2020 was 25.  Her HDL is low at 29.    I had a long discussion with both the patient and her daughter regarding her cardiac catheterization, echo results and continued primary prevention  Level of Care:                 Betsy Pool MD  08/26/20  .

## 2020-09-23 ENCOUNTER — APPOINTMENT (OUTPATIENT)
Dept: WOMENS IMAGING | Facility: HOSPITAL | Age: 70
End: 2020-09-23

## 2020-09-23 PROCEDURE — 77063 BREAST TOMOSYNTHESIS BI: CPT | Performed by: RADIOLOGY

## 2020-09-23 PROCEDURE — 77067 SCR MAMMO BI INCL CAD: CPT | Performed by: RADIOLOGY

## 2020-10-02 ENCOUNTER — APPOINTMENT (OUTPATIENT)
Dept: WOMENS IMAGING | Facility: HOSPITAL | Age: 70
End: 2020-10-02

## 2020-10-02 PROCEDURE — 77065 DX MAMMO INCL CAD UNI: CPT | Performed by: RADIOLOGY

## 2020-10-22 ENCOUNTER — APPOINTMENT (OUTPATIENT)
Dept: WOMENS IMAGING | Facility: HOSPITAL | Age: 70
End: 2020-10-22

## 2020-10-22 PROCEDURE — 88305 TISSUE EXAM BY PATHOLOGIST: CPT

## 2020-10-22 PROCEDURE — 19081 BX BREAST 1ST LESION STRTCTC: CPT | Performed by: RADIOLOGY

## 2020-11-09 DIAGNOSIS — E78.5 DYSLIPIDEMIA: Primary | ICD-10-CM

## 2020-11-09 RX ORDER — ROSUVASTATIN CALCIUM 20 MG/1
10 TABLET, COATED ORAL NIGHTLY
Qty: 45 TABLET | Refills: 1 | Status: SHIPPED | OUTPATIENT
Start: 2020-11-09 | End: 2020-11-10 | Stop reason: SDUPTHER

## 2020-11-09 NOTE — TELEPHONE ENCOUNTER
20-Dr Betsy Pool  Pt: Suzi Lindquist  : 1950  Number:   Reason for Call: Pt needs a refill on Crestor 20mg. #90 Please send to [facility]. 4 .

## 2020-11-10 ENCOUNTER — TELEPHONE (OUTPATIENT)
Dept: CARDIOLOGY | Facility: CLINIC | Age: 70
End: 2020-11-10

## 2020-11-10 RX ORDER — ROSUVASTATIN CALCIUM 20 MG/1
10 TABLET, COATED ORAL NIGHTLY
Qty: 45 TABLET | Refills: 1 | Status: SHIPPED | OUTPATIENT
Start: 2020-11-10 | End: 2021-03-05 | Stop reason: SDUPTHER

## 2021-03-02 DIAGNOSIS — Z23 IMMUNIZATION DUE: ICD-10-CM

## 2021-03-05 DIAGNOSIS — E78.5 DYSLIPIDEMIA: ICD-10-CM

## 2021-03-05 RX ORDER — ROSUVASTATIN CALCIUM 20 MG/1
10 TABLET, COATED ORAL NIGHTLY
Qty: 45 TABLET | Refills: 1 | Status: SHIPPED | OUTPATIENT
Start: 2021-03-05 | End: 2021-05-26 | Stop reason: SDUPTHER

## 2021-04-26 ENCOUNTER — APPOINTMENT (OUTPATIENT)
Dept: WOMENS IMAGING | Facility: HOSPITAL | Age: 71
End: 2021-04-26

## 2021-04-26 PROCEDURE — G0279 TOMOSYNTHESIS, MAMMO: HCPCS | Performed by: RADIOLOGY

## 2021-04-26 PROCEDURE — 77065 DX MAMMO INCL CAD UNI: CPT | Performed by: RADIOLOGY

## 2021-05-25 PROBLEM — I10 ESSENTIAL HYPERTENSION: Chronic | Status: ACTIVE | Noted: 2020-07-21

## 2021-05-25 PROBLEM — E11.9 DIABETES (HCC): Chronic | Status: ACTIVE | Noted: 2020-07-21

## 2021-05-25 PROBLEM — I25.10 CAD IN NATIVE ARTERY: Chronic | Status: ACTIVE | Noted: 2020-07-21

## 2021-05-25 PROBLEM — E78.5 DYSLIPIDEMIA: Chronic | Status: ACTIVE | Noted: 2020-07-21

## 2021-05-25 PROBLEM — N18.9 CKD (CHRONIC KIDNEY DISEASE): Chronic | Status: ACTIVE | Noted: 2020-07-21

## 2021-05-25 PROBLEM — M10.9 GOUT: Chronic | Status: ACTIVE | Noted: 2020-07-21

## 2021-05-26 ENCOUNTER — OFFICE VISIT (OUTPATIENT)
Dept: CARDIOLOGY | Facility: CLINIC | Age: 71
End: 2021-05-26

## 2021-05-26 VITALS
BODY MASS INDEX: 30.73 KG/M2 | SYSTOLIC BLOOD PRESSURE: 114 MMHG | DIASTOLIC BLOOD PRESSURE: 62 MMHG | HEART RATE: 62 BPM | WEIGHT: 180 LBS | HEIGHT: 64 IN

## 2021-05-26 DIAGNOSIS — E78.5 DYSLIPIDEMIA: Chronic | ICD-10-CM

## 2021-05-26 DIAGNOSIS — I10 ESSENTIAL HYPERTENSION: Chronic | ICD-10-CM

## 2021-05-26 DIAGNOSIS — I25.10 CAD IN NATIVE ARTERY: Primary | Chronic | ICD-10-CM

## 2021-05-26 PROCEDURE — 99214 OFFICE O/P EST MOD 30 MIN: CPT | Performed by: INTERNAL MEDICINE

## 2021-05-26 RX ORDER — ROSUVASTATIN CALCIUM 10 MG/1
10 TABLET, COATED ORAL NIGHTLY
Qty: 90 TABLET | Refills: 2 | Status: SHIPPED | OUTPATIENT
Start: 2021-05-26 | End: 2022-03-30

## 2021-05-26 NOTE — PROGRESS NOTES
"Chief Complaint  Coronary Artery Disease    Subjective    History of Present Illness      I had the pleasure of Ameena in the office today.  She is a 69-year-old female with a history of essential hypertension, dyslipidemia, diabetes mellitus, CKD and a positive family history of premature coronary disease.  She had cardiac catheterization in 2013 which revealed a 40 to 50% stenosis of her LAD.    She presented to the office in July 2020 with complaints of exertional chest discomfort which sounded typical of angina.  She underwent cardiac catheterization on 7/29/2020.  She was found to have a smoothly mildly calcified 30 to 40% narrowing of the mid proximal segment of the LAD.  The other vessels revealed no significant disease.  Her ejection fraction was normal.    Ms. Lindquist states that she is feeling well.  She is not experiencing chest discomfort or shortness of air.  No lower extremity edema.  No orthopnea or paroxysmal nocturnal dyspnea.  No palpitations, dizziness or near syncope.  She does walk.  She is tolerating her medications well.  Her blood pressures well controlled    Objective   Vital Signs:   /62   Pulse 62   Ht 162.6 cm (64\")   Wt 81.6 kg (180 lb)   BMI 30.90 kg/m²     Vitals and nursing note reviewed.   Constitutional:       General: Not in acute distress.     Appearance: Healthy appearance. Well-developed and not in distress. Not diaphoretic.   Eyes:      General: No scleral icterus.     Conjunctiva/sclera: Conjunctivae normal.      Pupils: Pupils are equal, round, and reactive to light.   HENT:      Head: Normocephalic and atraumatic.   Neck:      Thyroid: No thyromegaly.      Lymphadenopathy: No cervical adenopathy.   Pulmonary:      Effort: Pulmonary effort is normal. No respiratory distress.      Breath sounds: Normal breath sounds. No wheezing. No rales.   Cardiovascular:      PMI at left midclavicular line. Normal rate. Regular rhythm. Normal S1. Normal S2.      Murmurs:  There " is a 1/6 systolic murmur heard at the upper right sternal border      No gallop. No S3 and S4 gallop. No click. No rub.   Pulses:     Intact distal pulses. No decreased pulses.   Edema:     Peripheral edema absent.   Abdominal:      General: Bowel sounds are normal. There is no distension.      Palpations: Abdomen is soft. There is no abdominal mass.      Tenderness: There is no abdominal tenderness. There is no guarding or rebound.   Musculoskeletal: Normal range of motion.      Cervical back: Normal range of motion and neck supple. Skin:     General: Skin is warm and dry.      Coloration: Skin is not pale.      Findings: No rash.   Neurological:      Mental Status: Alert, oriented to person, place, and time and oriented to person, place and time.      Coordination: Coordination normal.      Gait: Gait is intact.   Psychiatric:         Behavior: Behavior normal.         Result Review :   The following data was reviewed by: Betsy Pool MD on 05/26/2021:  CMP    CMP 7/24/20 7/28/20 7/29/20   Glucose 206 (A) 142 (A) 97   BUN 36 (A) 34 (A) 28 (A)   Creatinine 1.99 (A) 1.85 (A) 1.60 (A)   eGFR African Am 30 (A) 33 (A) 39 (A)   Sodium 138 140 140   Potassium 4.9 4.6 4.1   Chloride 103 104 107   Calcium 9.3 9.1 9.0   Albumin 4.30 4.40    Total Bilirubin 0.4 0.3    Alkaline Phosphatase 60 62    AST (SGOT) 26 23    ALT (SGPT) 20 23    (A) Abnormal value            Lipid Panel    Lipid Panel 7/29/20   Total Cholesterol 81   Triglycerides 137   HDL Cholesterol 29 (A)   VLDL Cholesterol 27.4   LDL Cholesterol  25   LDL/HDL Ratio 0.85   (A) Abnormal value            TSH    TSH 7/28/20   TSH 1.450         She had a lipid profile in March 2021 which demonstrated total cholesterol of 94, triglycerides 133, HDL 34, LDL 39.  Her sodium was 138, potassium 4.8, BUN 30 and creatinine 1.96.  Glucose 155            Assessment and Plan    1. CAD in native artery   2013: LAD-there is a narrowing of the proximal segment followed by  40-50.  Circumflex and right coronary arteries were angiographically normal    July 2020: LAD with 30 to 40% stenosis.  Circumflex and right coronary arteries revealed no significant disease  No symptoms of angina.  She is on aspirin, rosuvastatin and metoprolol.    2. Essential hypertension  Controlled    3. Dyslipidemia  Her most recent lipid profile demonstrated an LDL of 39.  Her HDL was 34 and triglycerides 133.  She is on rosuvastatin and is within goal with her LDL  - rosuvastatin (CRESTOR) 10 MG tablet; Take 1 tablet by mouth Every Night.  Dispense: 90 tablet; Refill: 1    Ms. Lindquist is doing well from a cardiovascular standpoint.  I have not changed her medications.        Follow Up   No follow-ups on file.  Patient was given instructions and counseling regarding her condition or for health maintenance advice. Please see specific information pulled into the AVS if appropriate.

## 2021-09-24 ENCOUNTER — APPOINTMENT (OUTPATIENT)
Dept: WOMENS IMAGING | Facility: HOSPITAL | Age: 71
End: 2021-09-24

## 2021-09-24 PROCEDURE — 77067 SCR MAMMO BI INCL CAD: CPT | Performed by: RADIOLOGY

## 2021-09-24 PROCEDURE — 77063 BREAST TOMOSYNTHESIS BI: CPT | Performed by: RADIOLOGY

## 2022-03-30 DIAGNOSIS — E78.5 DYSLIPIDEMIA: Chronic | ICD-10-CM

## 2022-03-30 RX ORDER — ROSUVASTATIN CALCIUM 10 MG/1
TABLET, COATED ORAL
Qty: 90 TABLET | Refills: 1 | Status: SHIPPED | OUTPATIENT
Start: 2022-03-30 | End: 2022-11-21

## 2022-05-25 ENCOUNTER — OFFICE VISIT (OUTPATIENT)
Dept: CARDIOLOGY | Facility: CLINIC | Age: 72
End: 2022-05-25

## 2022-05-25 VITALS
DIASTOLIC BLOOD PRESSURE: 58 MMHG | BODY MASS INDEX: 30.22 KG/M2 | HEART RATE: 64 BPM | SYSTOLIC BLOOD PRESSURE: 108 MMHG | HEIGHT: 64 IN | WEIGHT: 177 LBS

## 2022-05-25 DIAGNOSIS — E78.5 DYSLIPIDEMIA: Chronic | ICD-10-CM

## 2022-05-25 DIAGNOSIS — I10 ESSENTIAL HYPERTENSION: Chronic | ICD-10-CM

## 2022-05-25 DIAGNOSIS — I25.10 CAD IN NATIVE ARTERY: Primary | Chronic | ICD-10-CM

## 2022-05-25 PROCEDURE — 93000 ELECTROCARDIOGRAM COMPLETE: CPT | Performed by: INTERNAL MEDICINE

## 2022-05-25 PROCEDURE — 99214 OFFICE O/P EST MOD 30 MIN: CPT | Performed by: INTERNAL MEDICINE

## 2022-05-25 NOTE — PROGRESS NOTES
"Chief Complaint  Coronary Artery Disease    Subjective    History of Present Illness      I had the pleasure of Ameena in the office today.  She is a 71-year-old female with a history of essential hypertension, dyslipidemia, diabetes mellitus, CKD and a positive family history of premature coronary disease.  She had cardiac catheterization in 2013 which revealed a 40 to 50% stenosis of her LAD.    She presented to the office in July 2020 with complaints of exertional chest discomfort which sounded typical of angina.  She underwent cardiac catheterization on 7/29/2020.  She was found to have a smoothly mildly calcified 30 to 40% narrowing of the mid proximal segment of the LAD.  The other vessels revealed no significant disease.  Her ejection fraction was normal.    Ms. Lindquist is doing well from a cardiac standpoint.  She is not complaining of chest discomfort or shortness of air.  No palpitations, dizziness or near syncope.  She is tolerating her medications well.  I did recheck her blood pressure in the office after she had been here a few minutes.  Her left arm was 136/70.  Her right arm was 138/66.  Ms. Lindquist does exercise.  We did review her most recent lab work.    Objective   Vital Signs:   /58   Pulse 64   Ht 162.6 cm (64\")   Wt 80.3 kg (177 lb)   BMI 30.38 kg/m²     Vitals and nursing note reviewed.   Constitutional:       General: Not in acute distress.     Appearance: Healthy appearance. Well-developed and not in distress. Not diaphoretic.   Eyes:      General: No scleral icterus.     Conjunctiva/sclera: Conjunctivae normal.      Pupils: Pupils are equal, round, and reactive to light.   HENT:      Head: Normocephalic and atraumatic.   Neck:      Thyroid: No thyromegaly.      Lymphadenopathy: No cervical adenopathy.   Pulmonary:      Effort: Pulmonary effort is normal. No respiratory distress.      Breath sounds: Normal breath sounds. No wheezing. No rales.   Cardiovascular:      PMI at left " midclavicular line. Normal rate. Regular rhythm. Normal S1. Normal S2.      Murmurs:  There is a 1/6 systolic murmur heard at the upper right sternal border      No gallop. No S3 and S4 gallop. No click. No rub.   Pulses:     Intact distal pulses. No decreased pulses.   Edema:     Peripheral edema absent.   Abdominal:      General: Bowel sounds are normal. There is no distension.      Palpations: Abdomen is soft. There is no abdominal mass.      Tenderness: There is no abdominal tenderness. There is no guarding or rebound.   Musculoskeletal: Normal range of motion.      Cervical back: Normal range of motion and neck supple. Skin:     General: Skin is warm and dry.      Coloration: Skin is not pale.      Findings: No rash.   Neurological:      Mental Status: Alert, oriented to person, place, and time and oriented to person, place and time.      Coordination: Coordination normal.      Gait: Gait is intact.   Psychiatric:         Behavior: Behavior normal.         Result Review :        Component 01/18/22 03/02/21 12/04/19 04/04/19 02/06/19 08/28/18   Cholesterol 97 94 101 87 79 123   HDL 34 34 Low  36 Low  34 Low  36 Low  36 Low    Triglycerides 162 133 188 High  161 High  109 237 High    LDL Calculated 39  39  39  29  24  57    CHOL/HDLC RATIO 2.9 2.8 2.8 2.6 2.2 3.4   LDL/HDL RATIO 1.1  -- -- -- -- --   Non HDL Chol. (LDL+VLDL) 63  60  65  53  43  87         Component 02/28/22 07/13/20 01/28/20   Glucose 96 95 76   BUN 31 42 High  22   Creatinine 2.06 2.05 High  1.84 High    GFR Calculated 25  -- --   BUN/Creatinine Ratio 15 20 12   Sodium 138 141 143   Potassium 4.6 5.0 5.1   Chloride 104 104 102   Bicarbonate (CO2) 22 23 23   Calcium 9.2 9.3 9.8   Total Protein 7.2 7.8 7.5   Albumin 4.3 4.6 4.8    Globulin 2.9 3.2 2.7   A/G Ratio 1.5 1.4 1.8   Total Bilirubin 0.2 0.4 0.4   Alkaline Phosphatase 76 68 74   AST (SGOT) 23 28 27   ALT (SGPT) 19 18 25              ECG 12 Lead    Date/Time: 5/25/2022 2:02 PM  Performed  by: Betsy Pool MD  Authorized by: Betsy Pool MD   Comparison: compared with previous ECG from 7/28/2020  Similar to previous ECG  Comments: Normal sinus rhythm.  Rate 61.  Normal EKG              Assessment and Plan    1. CAD in native artery  2013: LAD-there is a narrowing of the proximal segment followed by 40-50.  Circumflex and right coronary arteries were angiographically normal    July 2020: LAD with 30 to 40% stenosis.  Circumflex and right coronary arteries revealed no significant disease  No symptoms of angina.  She is on aspirin, metoprolol and rosuvastatin.    2. Essential hypertension  Her blood pressure is mildly elevated in the office today.  Looking back over some of her readings, most are within normal limits.  I have asked her to monitor from home.  I am going to decrease her metoprolol to 50 mg once a day.  She is on extended release.  On auscultation her heart rate is in the 50s.    3. Dyslipidemia  She is on rosuvastatin 10 mg daily.  Her most recent LDL is 39        Follow Up   No follow-ups on file.  Patient was given instructions and counseling regarding her condition or for health maintenance advice. Please see specific information pulled into the AVS if appropriate.

## 2022-09-26 ENCOUNTER — APPOINTMENT (OUTPATIENT)
Dept: WOMENS IMAGING | Facility: HOSPITAL | Age: 72
End: 2022-09-26

## 2022-09-26 PROCEDURE — 77063 BREAST TOMOSYNTHESIS BI: CPT | Performed by: RADIOLOGY

## 2022-09-26 PROCEDURE — 77067 SCR MAMMO BI INCL CAD: CPT | Performed by: RADIOLOGY

## 2022-11-21 DIAGNOSIS — E78.5 DYSLIPIDEMIA: Chronic | ICD-10-CM

## 2022-11-21 RX ORDER — ROSUVASTATIN CALCIUM 10 MG/1
TABLET, COATED ORAL
Qty: 90 TABLET | Refills: 3 | Status: SHIPPED | OUTPATIENT
Start: 2022-11-21

## 2023-05-25 ENCOUNTER — OFFICE VISIT (OUTPATIENT)
Dept: CARDIOLOGY | Facility: CLINIC | Age: 73
End: 2023-05-25
Payer: MEDICARE

## 2023-05-25 VITALS
WEIGHT: 180 LBS | HEART RATE: 53 BPM | HEIGHT: 64 IN | BODY MASS INDEX: 30.73 KG/M2 | DIASTOLIC BLOOD PRESSURE: 74 MMHG | SYSTOLIC BLOOD PRESSURE: 130 MMHG

## 2023-05-25 DIAGNOSIS — I25.10 CAD IN NATIVE ARTERY: Primary | ICD-10-CM

## 2023-05-25 DIAGNOSIS — E78.5 DYSLIPIDEMIA: ICD-10-CM

## 2023-05-25 DIAGNOSIS — I10 ESSENTIAL HYPERTENSION: ICD-10-CM

## 2023-05-25 PROCEDURE — 3078F DIAST BP <80 MM HG: CPT | Performed by: NURSE PRACTITIONER

## 2023-05-25 PROCEDURE — 3075F SYST BP GE 130 - 139MM HG: CPT | Performed by: NURSE PRACTITIONER

## 2023-05-25 PROCEDURE — 1159F MED LIST DOCD IN RCRD: CPT | Performed by: NURSE PRACTITIONER

## 2023-05-25 PROCEDURE — 99214 OFFICE O/P EST MOD 30 MIN: CPT | Performed by: NURSE PRACTITIONER

## 2023-05-25 PROCEDURE — 93000 ELECTROCARDIOGRAM COMPLETE: CPT | Performed by: NURSE PRACTITIONER

## 2023-05-25 PROCEDURE — 1160F RVW MEDS BY RX/DR IN RCRD: CPT | Performed by: NURSE PRACTITIONER

## 2023-05-25 RX ORDER — ZINC 25 MG
1 TABLET ORAL
COMMUNITY

## 2023-05-25 RX ORDER — FINERENONE 10 MG/1
TABLET, FILM COATED ORAL
COMMUNITY
Start: 2023-05-12

## 2023-05-25 RX ORDER — ICOSAPENT ETHYL 1000 MG/1
1 CAPSULE ORAL
COMMUNITY
Start: 2023-01-24

## 2023-05-25 RX ORDER — SACCHAROMYCES BOULARDII 250 MG
1 CAPSULE ORAL
COMMUNITY

## 2023-05-25 NOTE — PROGRESS NOTES
"Date of Office Visit: 23  Encounter Provider: ELIZABETH Triana  Place of Service: [facility]  Patient Name: Suzi Lindquist  :1950    Chief Complaint   Patient presents with   • CAD in native artery   • Hyperlipidemia   • Hypertension   • Follow-up   :     HPI: Suzi Lindquist is a 72 y.o. female  With hypertension, hyperlipidemia, coronary artery disease, Diabetes mellitus, gout  and stage 4 kidney disease.    This is a former patient of Dr. Betsy Pool. I will visit with her for the first time and have reviewed her medical record.     She complained of worsening exertional chest pain and had cardiac catheterization 2020 which showed no significant disease of the left main, 30-40% LAD disease, no significant left circumflex disease and no significant right coronary artery disease.  There was high normal to mildly elevated left ventricular filling pressure.  Echocardiogram at that time showed normal left ventricular systolic function, normal diastolic function, mild thickening of the aortic valve, mild mitral annular calcification, mild mitral valve regurgitation and mild tricuspid valve regurgitation.      Patient presents today to transition care to our practice.  She is to have cataract surgery next week.  She has no chest pain or shortness of breath or palpitations or lightheadedness.  She does yard work a couple days a week and also walks routinely up to 45 minutes.  She has neuropathy and some occasional feet and hand swelling but that does not linger.  She has some swelling after she has high sodium meal.  She is now on Vascepa due to elevated triglycerides earlier this year.  She has follow-up blood work due in July.  She takes aspirin daily and has no blood in the urine or stool.  Allergies   Allergen Reactions   • Pineapple Itching     \"throat itch\"   • Avocado Nausea And Vomiting           Family and social history reviewed.     ROS  All " "other systems were reviewed and are negative          Objective:     Vitals:    05/25/23 1327   BP: 130/74   BP Location: Left arm   Patient Position: Sitting   Pulse: 53   Weight: 81.6 kg (180 lb)   Height: 162.6 cm (64\")     Body mass index is 30.9 kg/m².    PHYSICAL EXAM:  Pulmonary:      Effort: Pulmonary effort is normal.      Breath sounds: Normal breath sounds.   Cardiovascular:      Murmurs: There is a grade 1/6 systolic murmur at the URSB.           ECG 12 Lead    Date/Time: 5/25/2023 1:45 PM  Performed by: Teodora Zhang APRN  Authorized by: Teodora hZang APRN   Comparison: compared with previous ECG   Similar to previous ECG  Rhythm: sinus rhythm  Rate: normal  T flattening: III  QRS axis: normal  Comments: No ischemic changes.               Current Outpatient Medications   Medication Sig Dispense Refill   • allopurinol (ZYLOPRIM) 100 MG tablet Take 1 tablet by mouth Daily.     • amLODIPine (NORVASC) 10 MG tablet Take 1 tablet by mouth Daily.     • Ascorbic Acid (VITAMIN C) 500 MG capsule Take 500 mg by mouth Daily.     • aspirin 81 MG EC tablet Take 1 tablet by mouth Daily.     • cholecalciferol (Cholecalciferol) 25 MCG (1000 UT) tablet Take 2 tablets by mouth Every Other Day.     • Continuous Blood Gluc Sensor (FreeStyle Patric 2 Sensor) misc      • CRANBERRY SOFT PO Take 4,200 Units by mouth 3 (Three) Times a Week.     • cyanocobalamin (VITAMIN B-12) 1000 MCG tablet Take 2,500 Units by mouth 3 (Three) Times a Week.     • gabapentin (NEURONTIN) 300 MG capsule Take 1 capsule by mouth Daily.     • glipizide (GLUCOTROL) 5 MG tablet Take 2.5 mg by mouth 2 (Two) Times a Day Before Meals.     • glucose (DEX4) 4 GM chewable tablet 4 tablets.     • hydroCHLOROthiazide (HYDRODIURIL) 25 MG tablet Take 1 tablet by mouth Daily.     • icosapent ethyl (VASCEPA) 1 g capsule capsule Take 1 g by mouth.     • insulin NPH (humuLIN N,novoLIN N) 100 UNIT/ML injection Inject 15 Units under the skin into the appropriate area " as directed Every Morning.     • insulin NPH (humuLIN N,novoLIN N) 100 UNIT/ML injection Inject 32 Units under the skin into the appropriate area as directed Every Night.     • Kerendia 10 MG tablet      • losartan (COZAAR) 25 MG tablet Take 1 tablet by mouth Daily.     • metoprolol succinate XL (TOPROL-XL) 50 MG 24 hr tablet Take 1 tablet by mouth 2 (two) times a day.     • rosuvastatin (CRESTOR) 10 MG tablet TAKE 1 TABLET BY MOUTH AT  NIGHT 90 tablet 3   • saccharomyces boulardii (FLORASTOR) 250 MG capsule Take 1 capsule by mouth.     • Zinc 25 MG tablet Take 1 tablet by mouth.       No current facility-administered medications for this visit.     Assessment:       Diagnosis Plan   1. CAD in native artery  ECG 12 Lead      2. Dyslipidemia  ECG 12 Lead           Orders Placed This Encounter   Procedures   • ECG 12 Lead     This order was created via procedure documentation     Order Specific Question:   Release to patient     Answer:   Routine Release         Plan:       1.  72-year-old female with nonobstructive coronary artery disease on cardiac catheterization July 2020. No angina. No Ischemic EKG changes. Continue aspirin and statin therapy.   2.  Hypertension- BP appears adequately controlled  3.  Hyperlipidemia on rosuvastatin 10 mg-Target LDL 70 or less. LDL was 30 and at goal in January 2023. triglycerides were 262 so she is now on vascepa. She had elevated liver enzymes with higher dosed statin in the past.   4.  Diabetes mellitus on insulin follows with endocrinology . Last a 1c was 7.8%   5. CKD, stage 4- follows with Dr. Marty Paiz. Last scr I can review is 02/28/2023 at 1.76 BUN 29 GFR 30  6. Need for cataract surgery- cleared to proceed as scheduled and may hold aspirin as directed and resume aspirin asap post procedure    Call with questions or concerns.   Follow up in 6 months and meet Dr. Foy            It has been a pleasure to participate in this patient's care.      Thank you,  Teodora  ELIZABETH Zhang      **I used Dragon to dictate this note:**

## 2023-09-29 ENCOUNTER — APPOINTMENT (OUTPATIENT)
Dept: WOMENS IMAGING | Facility: HOSPITAL | Age: 73
End: 2023-09-29
Payer: MEDICARE

## 2023-09-29 PROCEDURE — 77063 BREAST TOMOSYNTHESIS BI: CPT | Performed by: RADIOLOGY

## 2023-09-29 PROCEDURE — 77067 SCR MAMMO BI INCL CAD: CPT | Performed by: RADIOLOGY

## 2023-11-29 ENCOUNTER — OFFICE VISIT (OUTPATIENT)
Dept: CARDIOLOGY | Facility: CLINIC | Age: 73
End: 2023-11-29
Payer: MEDICARE

## 2023-11-29 VITALS
SYSTOLIC BLOOD PRESSURE: 134 MMHG | DIASTOLIC BLOOD PRESSURE: 72 MMHG | HEART RATE: 60 BPM | HEIGHT: 64 IN | BODY MASS INDEX: 29.78 KG/M2 | WEIGHT: 174.4 LBS

## 2023-11-29 DIAGNOSIS — E78.5 DYSLIPIDEMIA: Chronic | ICD-10-CM

## 2023-11-29 DIAGNOSIS — N18.4 STAGE 4 CHRONIC KIDNEY DISEASE: Primary | Chronic | ICD-10-CM

## 2023-11-29 DIAGNOSIS — I10 ESSENTIAL HYPERTENSION: Chronic | ICD-10-CM

## 2023-11-29 PROBLEM — Z79.4 TYPE 2 DIABETES MELLITUS WITHOUT COMPLICATION, WITH LONG-TERM CURRENT USE OF INSULIN: Status: ACTIVE | Noted: 2020-07-21

## 2023-11-29 RX ORDER — CHLORHEXIDINE GLUCONATE ORAL RINSE 1.2 MG/ML
15 SOLUTION DENTAL 2 TIMES DAILY
COMMUNITY
Start: 2023-11-08

## 2023-11-29 RX ORDER — DAPAGLIFLOZIN 5 MG/1
5 TABLET, FILM COATED ORAL
COMMUNITY
Start: 2023-07-28

## 2023-11-29 NOTE — PROGRESS NOTES
"      CARDIOLOGY    Lu Foy MD    ENCOUNTER DATE:  11/29/2023    Suzi Lindquist / 73 y.o. / female        CHIEF COMPLAINT / REASON FOR OFFICE VISIT     Follow-up (HLD, HTN, CAD)      HISTORY OF PRESENT ILLNESS       HPI    Suzi Lindquist is a 73 y.o. female     This is a lady who previously saw Dr. Betsy Guerra.  She saw Teodora in May 2023.  She has hypertension, hyperlipidemia, diabetes, coronary artery disease and stage IV kidney disease.    She had a heart catheterization in July 2020 which showed some nonobstructive disease in the LAD and no significant disease in the circumflex or the right coronary artery.  She had mildly elevated left ventricular filling pressures.  Echocardiogram in 2020 showed normal LV systolic and diastolic function and no significant valve disease.    She is here today mainly for the first time.  She is doing well.  She walks and does yard work.  She has noticed if she does not eat before exerting herself she does not have any chest discomfort.  Breathing is at baseline.    REVIEW OF SYSTEMS     Review of Systems   Constitutional: Negative for chills, fever, weight gain and weight loss.   Cardiovascular:  Negative for leg swelling.   Respiratory:  Negative for cough, snoring and wheezing.    Hematologic/Lymphatic: Negative for bleeding problem. Does not bruise/bleed easily.   Skin:  Negative for color change.   Musculoskeletal:  Negative for falls, joint pain and myalgias.   Gastrointestinal:  Negative for melena.   Genitourinary:  Negative for hematuria.   Neurological:  Negative for excessive daytime sleepiness.   Psychiatric/Behavioral:  Negative for depression. The patient is not nervous/anxious.          VITAL SIGNS     Visit Vitals  /72 (BP Location: Left arm, Patient Position: Sitting, Cuff Size: Small Adult)   Pulse 60   Ht 162.6 cm (64\")   Wt 79.1 kg (174 lb 6.4 oz)   BMI 29.94 kg/m²         Wt Readings from Last 3 Encounters:   11/29/23 79.1 kg (174 lb 6.4 " oz)   05/25/23 81.6 kg (180 lb)   05/25/22 80.3 kg (177 lb)     Body mass index is 29.94 kg/m².      PHYSICAL EXAMINATION     Constitutional:       General: Not in acute distress.  Neck:      Vascular: No carotid bruit or JVD.   Pulmonary:      Effort: Pulmonary effort is normal.      Breath sounds: Normal breath sounds.   Cardiovascular:      Normal rate. Regular rhythm.      Murmurs: There is a grade 2/6 systolic murmur.   Psychiatric:         Mood and Affect: Mood and affect normal.           REVIEWED DATA       ECG 12 Lead    Date/Time: 11/29/2023 11:25 AM  Performed by: Lu Foy MD    Authorized by: Lu Foy MD  Comparison: compared with previous ECG from 5/25/2023  Similar to previous ECG  Rhythm: sinus rhythm  BPM: 60  Conduction: conduction normal  ST Segments: ST segments normal  T Waves: T waves normal    Clinical impression: normal ECG                Lab Results   Component Value Date    GLUCOSE 97 07/29/2020    BUN 28 (H) 07/29/2020    CREATININE 1.60 (H) 07/29/2020    BCR 17.5 07/29/2020    K 4.1 07/29/2020    CO2 24.2 07/29/2020    CALCIUM 9.0 07/29/2020    ALBUMIN 4.40 07/28/2020    BILITOT 0.3 07/28/2020    AST 23 07/28/2020    ALT 23 07/28/2020       ASSESSMENT & PLAN      Diagnosis Plan   1. Stage 4 chronic kidney disease        2. Essential hypertension        3. Dyslipidemia            1.  Nonobstructive coronary artery disease by cath in July 2020.  No anginal symptoms at this time.  2.  Hypertension.  Blood pressure is controlled.  I am not going to change her medications.  3.  Hyperlipidemia.  On rosuvastatin.  4.  Diabetes.  On insulin.  She follows with endocrinology at [facility].  5.  Chronic kidney disease stage IV.  Follows with Dr. Paiz.  Last creatinine was 1.6 as above.  She has lab work coming up with his office soon    She is stable from a cardiac standpoint.  She will follow-up with Teodora in 1 year unless her symptoms change sooner.    Orders Placed This Encounter    Procedures    ECG 12 Lead     This order was created via procedure documentation     Order Specific Question:   Release to patient     Answer:   Routine Release [[id number]]           MEDICATIONS         Discharge Medications            Accurate as of November 29, 2023 11:25 AM. If you have any questions, ask your nurse or doctor.                Changes to Medications        Instructions Start Date   insulin  UNIT/ML injection  Commonly known as: humuLIN N,novoLIN N  What changed: Another medication with the same name was removed. Continue taking this medication, and follow the directions you see here.  Changed by: Lu Foy MD   15 Units, Subcutaneous, Every Morning             Continue These Medications        Instructions Start Date   allopurinol 100 MG tablet  Commonly known as: ZYLOPRIM   100 mg, Oral, Daily      amLODIPine 10 MG tablet  Commonly known as: NORVASC   10 mg, Oral, Daily      aspirin 81 MG EC tablet   81 mg, Oral, Daily      chlorhexidine 0.12 % solution  Commonly known as: PERIDEX   15 mL, Mouth/Throat, 2 Times Daily      cholecalciferol 25 MCG (1000 UT) tablet  Commonly known as: VITAMIN D3   2,000 Units, Oral, Every Other Day      CRANBERRY SOFT PO   4,200 Units, Oral, 3 Times Weekly      cyanocobalamin 1000 MCG tablet  Commonly known as: VITAMIN B-12   2,500 Units, Oral, 3 Times Weekly      Farxiga 5 MG tablet tablet  Generic drug: dapagliflozin   5 mg, Oral      FreeStyle Patric 2 Sensor misc   No dose, route, or frequency recorded.      gabapentin 300 MG capsule  Commonly known as: NEURONTIN   300 mg, Oral, Daily      glipizide 5 MG tablet  Commonly known as: GLUCOTROL   2.5 mg, Oral, 2 Times Daily Before Meals      glucose 4 GM chewable tablet  Commonly known as: DEX4   16 g      hydroCHLOROthiazide 25 MG tablet  Commonly known as: HYDRODIURIL   25 mg, Oral, Daily      icosapent ethyl 1 g capsule capsule  Commonly known as: VASCEPA   1 capsule, Oral      losartan 25 MG  tablet  Commonly known as: COZAAR   25 mg, Oral, Daily      metoprolol succinate XL 50 MG 24 hr tablet  Commonly known as: TOPROL-XL   50 mg, Oral, 2 times daily      rosuvastatin 10 MG tablet  Commonly known as: CRESTOR   TAKE 1 TABLET BY MOUTH AT  NIGHT      saccharomyces boulardii 250 MG capsule  Commonly known as: FLORASTOR   1 capsule, Oral      Vitamin C 500 MG capsule   500 mg, Oral, Daily      Zinc 25 MG tablet   1 tablet, Oral             Stop These Medications      Kerendia 10 MG tablet  Generic drug: Finerenone  Stopped by: MD Lu Alva MD  11/29/23  11:25 EST    Part of this note may be an electronic transcription/translation of spoken language to printed text using the Dragon dictation system.

## 2024-09-19 ENCOUNTER — TELEPHONE (OUTPATIENT)
Dept: CARDIOLOGY | Facility: CLINIC | Age: 74
End: 2024-09-19

## 2024-09-19 NOTE — TELEPHONE ENCOUNTER
Dr. Mumtaz Short's office at [facility] are requesting a Cardiac Clearance for Pt to have a Colonoscopy to be done under general anesthesia or IV ac Sedation.     Does Pt have condition in which Pre-operative antibiotics are required?      Please ax to [phone]

## 2024-09-24 ENCOUNTER — APPOINTMENT (OUTPATIENT)
Dept: WOMENS IMAGING | Facility: HOSPITAL | Age: 74
End: 2024-09-24
Payer: MEDICARE

## 2024-09-24 PROCEDURE — G0279 TOMOSYNTHESIS, MAMMO: HCPCS | Performed by: RADIOLOGY

## 2024-09-24 PROCEDURE — 77066 DX MAMMO INCL CAD BI: CPT | Performed by: RADIOLOGY

## 2024-09-24 PROCEDURE — 76642 ULTRASOUND BREAST LIMITED: CPT | Performed by: RADIOLOGY

## 2024-10-01 ENCOUNTER — LAB REQUISITION (OUTPATIENT)
Dept: LAB | Facility: HOSPITAL | Age: 74
End: 2024-10-01
Payer: MEDICARE

## 2024-10-01 ENCOUNTER — APPOINTMENT (OUTPATIENT)
Dept: WOMENS IMAGING | Facility: HOSPITAL | Age: 74
End: 2024-10-01
Payer: MEDICARE

## 2024-10-01 DIAGNOSIS — N63.0 UNSPECIFIED LUMP IN UNSPECIFIED BREAST: ICD-10-CM

## 2024-10-01 PROCEDURE — 88341 IMHCHEM/IMCYTCHM EA ADD ANTB: CPT | Performed by: NURSE PRACTITIONER

## 2024-10-01 PROCEDURE — 88360 TUMOR IMMUNOHISTOCHEM/MANUAL: CPT | Performed by: NURSE PRACTITIONER

## 2024-10-01 PROCEDURE — 19083 BX BREAST 1ST LESION US IMAG: CPT | Performed by: RADIOLOGY

## 2024-10-01 PROCEDURE — 38505 NEEDLE BIOPSY LYMPH NODES: CPT | Performed by: RADIOLOGY

## 2024-10-01 PROCEDURE — 88342 IMHCHEM/IMCYTCHM 1ST ANTB: CPT | Performed by: NURSE PRACTITIONER

## 2024-10-01 PROCEDURE — A4648 IMPLANTABLE TISSUE MARKER: HCPCS | Performed by: RADIOLOGY

## 2024-10-01 PROCEDURE — 88305 TISSUE EXAM BY PATHOLOGIST: CPT | Performed by: NURSE PRACTITIONER

## 2024-10-04 LAB
CYTO UR: NORMAL
DX PRELIMINARY: NORMAL
LAB AP CASE REPORT: NORMAL
LAB AP SPECIAL STAINS: NORMAL
LAB AP SYNOPTIC CHECKLIST: NORMAL
PATH REPORT.FINAL DX SPEC: NORMAL
PATH REPORT.GROSS SPEC: NORMAL

## 2025-02-07 LAB
CYTO UR: NORMAL
DX PRELIMINARY: NORMAL
LAB AP CASE REPORT: NORMAL
LAB AP SPECIAL STAINS: NORMAL
LAB AP SYNOPTIC CHECKLIST: NORMAL
PATH REPORT.ADDENDUM SPEC: NORMAL
PATH REPORT.FINAL DX SPEC: NORMAL
PATH REPORT.GROSS SPEC: NORMAL

## (undated) DEVICE — KT MANIFLD CARDIAC

## (undated) DEVICE — GW EMR FIX EXCHG J STD .035 3MM 260CM

## (undated) DEVICE — GLIDESHEATH SLENDER STAINLESS STEEL KIT: Brand: GLIDESHEATH SLENDER

## (undated) DEVICE — CATH DIAG IMPULSE FR4 6F 100CM

## (undated) DEVICE — CATH DIAG EXPO .045 FL3  5F 100CM

## (undated) DEVICE — PK CATH CARD 40